# Patient Record
(demographics unavailable — no encounter records)

---

## 2024-12-19 NOTE — HISTORY OF PRESENT ILLNESS
[FreeTextEntry1] : 57 year old male with a history of cad, hyperlipidemia and htn who is s/p pci rca 3/24 and lcx and lad in 5/24.  He continues to have chest pain and dyspnea on exertion.  He states that he has the same problem pre and post stent.

## 2025-02-06 NOTE — HISTORY OF PRESENT ILLNESS
[FreeTextEntry1] : 57 year old male with a history of cad, hyperlipidemia and htn who is s/p pci rca 3/24 and lcx and lad in 5/24.  He had chest pain and dyspnea on exertion.  He states that he has the same problem pre and post stent.  He had a cath which showed severe prox LAD lesion and stent.  on 1/13/25.  No chest pain or sob.

## 2025-02-06 NOTE — DISCUSSION/SUMMARY
[FreeTextEntry1] : 1.  cad -  s/p stent to the LAD.  continue asa and plavix.   [EKG obtained to assist in diagnosis and management of assessed problem(s)] : EKG obtained to assist in diagnosis and management of assessed problem(s)

## 2025-06-19 NOTE — DISCUSSION/SUMMARY
[FreeTextEntry1] : 1.  cad -  s/p stent to the LAD.  continue asa stop plavix in july [EKG obtained to assist in diagnosis and management of assessed problem(s)] : EKG obtained to assist in diagnosis and management of assessed problem(s)

## 2025-06-19 NOTE — HISTORY OF PRESENT ILLNESS
[FreeTextEntry1] : 58 year old male with a history of cad, hyperlipidemia and htn who is s/p pci rca 3/24 and lcx and lad in 5/24.  He had chest pain and dyspnea on exertion.  He states that he has the same problem pre and post stent.  He had a cath which showed severe prox LAD lesion and stent.  on 1/13/25.  No chest pain or sob.